# Patient Record
Sex: FEMALE | Race: BLACK OR AFRICAN AMERICAN | NOT HISPANIC OR LATINO | URBAN - METROPOLITAN AREA
[De-identification: names, ages, dates, MRNs, and addresses within clinical notes are randomized per-mention and may not be internally consistent; named-entity substitution may affect disease eponyms.]

---

## 2024-01-13 ENCOUNTER — OFFICE VISIT (OUTPATIENT)
Dept: URGENT CARE | Facility: URGENT CARE | Age: 34
End: 2024-01-13
Payer: COMMERCIAL

## 2024-01-13 VITALS
SYSTOLIC BLOOD PRESSURE: 98 MMHG | TEMPERATURE: 97.2 F | DIASTOLIC BLOOD PRESSURE: 63 MMHG | BODY MASS INDEX: 20.94 KG/M2 | HEART RATE: 85 BPM | HEIGHT: 65 IN | OXYGEN SATURATION: 99 % | WEIGHT: 125.66 LBS

## 2024-01-13 DIAGNOSIS — R05.1 ACUTE COUGH: ICD-10-CM

## 2024-01-13 DIAGNOSIS — Z32.01 PREGNANCY CONFIRMED BY POSITIVE URINE TEST: ICD-10-CM

## 2024-01-13 DIAGNOSIS — J06.9 UPPER RESPIRATORY VIRUS: Primary | ICD-10-CM

## 2024-01-13 LAB
FLUAV AG SPEC QL IA: NEGATIVE
FLUBV AG SPEC QL IA: NEGATIVE
HCG UR QL: POSITIVE

## 2024-01-13 PROCEDURE — 81025 URINE PREGNANCY TEST: CPT | Performed by: INTERNAL MEDICINE

## 2024-01-13 PROCEDURE — 99203 OFFICE O/P NEW LOW 30 MIN: CPT | Performed by: INTERNAL MEDICINE

## 2024-01-13 PROCEDURE — 87804 INFLUENZA ASSAY W/OPTIC: CPT | Performed by: INTERNAL MEDICINE

## 2024-01-13 PROCEDURE — 87635 SARS-COV-2 COVID-19 AMP PRB: CPT | Performed by: INTERNAL MEDICINE

## 2024-01-13 RX ORDER — LEVOTHYROXINE SODIUM 75 UG/1
75 TABLET ORAL DAILY
COMMUNITY

## 2024-01-13 NOTE — PATIENT INSTRUCTIONS
Use acetaminophen (Tylenol) as needed for pain or fever.  You have a cold that will go away on its own in the next week.

## 2024-01-13 NOTE — PROGRESS NOTES
"  Assessment & Plan     Upper respiratory virus  Considered range of possible etiologies including rhinovirus, enterovirus, adenovirus, influenza virus, seasonal coronavirus, human metapneumovirus, parainfluenza virus, RSV and COVID-19.  Based upon the current COVID-19 rates in the community, the pre-test probability of COVID-19 is perhaps 10%.  We'll go ahead and test.  In the meantime, recommend social isolation pending test results and supportive care.     Acute cough  - Influenza A & B Antigen - Clinic Collect  - Symptomatic COVID-19 Virus (Coronavirus) by PCR Nose    Pregnancy confirmed by positive urine test  Advised follow-up for prenatal care with Dr. Moffett who has been helping her with fertility management.  Patient asked if she should continue levothyroxine and metformin.  Both are safe for the time being and further direction on this will be per Dr. Moffett.  - HCG qualitative urine    Yves Moore MD  [facility] [place]    Florecita Rodgers is a 33 year old, presenting for the following health issues:  Urgent Care (Sore on lower lip and tip of tongue x 4d. ), Pregnancy Test (Request for pregnancy test), and Cough (Cough x 4d.)    HPI   Chief complaint of cough, irritation of tongue and lips, headache.  This has been present for the past several days.  Some sore throat initially.  Nose is congested.  Some mild sinus pressure.  Hasn't tested for COVID. Denies known exposures.  Wondering if she may be pregnant.  States that her LMP was 12/13/2023.  Her normal cycle is regular every four weeks.  She has been worked up for some fertility challenges by Dr. Nahed Moffett who prescribed levothyroxine and metformin.  Is hoping to get pregnant.    Review of Systems   Constitutional, HEENT, cardiovascular, pulmonary, gi and gu systems are negative, except as otherwise noted.      Objective    BP 98/63   Pulse 85   Temp 97.2  F (36.2  C) (Temporal)   Ht 1.651 m (5' 5\")   Wt 57 kg " (125 lb 10.6 oz)   LMP  (LMP Unknown)   SpO2 99%   BMI 20.91 kg/m    Body mass index is 20.91 kg/m .  Physical Exam   GENERAL APPEARANCE: healthy, alert, and no distress  HENT: ear canals and TM's normal and cobblestoning of the posterior pharynx with post-nasal drainage   NECK: no adenopathy and no asymmetry, masses, or scars  RESP: lungs clear to auscultation - no rales, rhonchi or wheezes  CV: regular rates and rhythm, normal S1 S2, no S3 or S4, and no murmur, click or rub    Results for orders placed or performed in visit on 01/13/24 (from the past 24 hour(s))   HCG qualitative urine   Result Value Ref Range    hCG Urine Qualitative Positive (A) Negative   Influenza A & B Antigen - Clinic Collect    Specimen: Nose; Swab   Result Value Ref Range    Influenza A antigen Negative Negative    Influenza B antigen Negative Negative    Narrative    Test results must be correlated with clinical data. If necessary, results should be confirmed by a molecular assay or viral culture.

## 2024-01-14 LAB — SARS-COV-2 RNA RESP QL NAA+PROBE: NEGATIVE

## 2024-09-27 ENCOUNTER — NURSE TRIAGE (OUTPATIENT)
Dept: NURSING | Facility: CLINIC | Age: 34
End: 2024-09-27
Payer: COMMERCIAL

## 2024-09-27 NOTE — TELEPHONE ENCOUNTER
Tha  #[id number]    Aseter is ~4 days Postpartum    She reports that she has a lot of milk in her breasts and wants to know what she can do.    She is a pt of [facility] in [place].    5:26 pm - Warm transferred to [place],  for [facility].    Lisandra Rowland RN  [facility]      Reason for Disposition   [1] Follow-up call from patient regarding patient's clinical status AND [2] information urgent    Protocols used: PCP Call - No Triage-A-